# Patient Record
Sex: FEMALE | URBAN - METROPOLITAN AREA
[De-identification: names, ages, dates, MRNs, and addresses within clinical notes are randomized per-mention and may not be internally consistent; named-entity substitution may affect disease eponyms.]

---
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
## 2023-12-31 PROBLEM — Z11.3 ENCOUNTER FOR SCREENING EXAMINATION FOR SEXUALLY TRANSMITTED DISEASE: Status: RESOLVED | Noted: 2019-01-04 | Resolved: 2020-12-23

## 2024-04-15 NOTE — END OF VISIT
c/ annual pacer ck/thresholds    See scanned documents [>50% of Time Spent on Counseling for ____] : Greater than 50% of the encounter time was spent on counseling for [unfilled] [Time Spent: ___ minutes] : I have spent [unfilled] minutes of face to face time with the patient .

## 2025-07-22 ENCOUNTER — ASOB RESULT (OUTPATIENT)
Age: 20
End: 2025-07-22

## 2025-07-22 ENCOUNTER — APPOINTMENT (OUTPATIENT)
Dept: ANTEPARTUM | Facility: CLINIC | Age: 20
End: 2025-07-22
Payer: COMMERCIAL

## 2025-07-22 PROCEDURE — 76813 OB US NUCHAL MEAS 1 GEST: CPT

## 2025-07-22 PROCEDURE — 76801 OB US < 14 WKS SINGLE FETUS: CPT | Mod: 59

## 2025-09-16 ENCOUNTER — ASOB RESULT (OUTPATIENT)
Age: 20
End: 2025-09-16

## 2025-09-16 ENCOUNTER — APPOINTMENT (OUTPATIENT)
Dept: ANTEPARTUM | Facility: CLINIC | Age: 20
End: 2025-09-16
Payer: COMMERCIAL

## 2025-09-16 PROCEDURE — 76805 OB US >/= 14 WKS SNGL FETUS: CPT
